# Patient Record
Sex: FEMALE | Race: WHITE | NOT HISPANIC OR LATINO | Employment: OTHER | URBAN - METROPOLITAN AREA
[De-identification: names, ages, dates, MRNs, and addresses within clinical notes are randomized per-mention and may not be internally consistent; named-entity substitution may affect disease eponyms.]

---

## 2018-06-18 ENCOUNTER — TELEPHONE (OUTPATIENT)
Dept: NEUROLOGY | Facility: CLINIC | Age: 78
End: 2018-06-18

## 2018-10-16 ENCOUNTER — TELEPHONE (OUTPATIENT)
Dept: NEUROLOGY | Facility: CLINIC | Age: 78
End: 2018-10-16

## 2018-10-23 ENCOUNTER — OFFICE VISIT (OUTPATIENT)
Dept: NEUROLOGY | Facility: CLINIC | Age: 78
End: 2018-10-23
Payer: COMMERCIAL

## 2018-10-23 VITALS
HEIGHT: 64 IN | DIASTOLIC BLOOD PRESSURE: 78 MMHG | SYSTOLIC BLOOD PRESSURE: 116 MMHG | WEIGHT: 139 LBS | BODY MASS INDEX: 23.73 KG/M2 | HEART RATE: 77 BPM

## 2018-10-23 DIAGNOSIS — R25.1 TREMOR: Primary | ICD-10-CM

## 2018-10-23 DIAGNOSIS — R41.9 COGNITIVE COMPLAINTS WITH NORMAL EXAM: ICD-10-CM

## 2018-10-23 PROBLEM — E78.00 HIGH CHOLESTEROL: Status: ACTIVE | Noted: 2018-10-23

## 2018-10-23 PROBLEM — I10 HYPERTENSION: Status: ACTIVE | Noted: 2018-10-23

## 2018-10-23 PROBLEM — F32.A DEPRESSION: Status: ACTIVE | Noted: 2018-10-23

## 2018-10-23 PROBLEM — F41.9 ANXIETY: Status: ACTIVE | Noted: 2018-10-23

## 2018-10-23 PROCEDURE — 99204 OFFICE O/P NEW MOD 45 MIN: CPT | Performed by: PSYCHIATRY & NEUROLOGY

## 2018-10-23 RX ORDER — OMEPRAZOLE 20 MG/1
20 CAPSULE, DELAYED RELEASE ORAL DAILY
COMMUNITY
Start: 2018-08-20

## 2018-10-23 RX ORDER — GREEN TEA/HOODIA GORDONII 315-12.5MG
CAPSULE ORAL 4 TIMES DAILY PRN
COMMUNITY

## 2018-10-23 RX ORDER — RALOXIFENE HYDROCHLORIDE 60 MG/1
60 TABLET, FILM COATED ORAL DAILY
COMMUNITY
Start: 2016-11-29

## 2018-10-23 RX ORDER — CHOLESTYRAMINE 4 G/9G
POWDER, FOR SUSPENSION ORAL
COMMUNITY
Start: 2018-10-15

## 2018-10-23 RX ORDER — VALACYCLOVIR HYDROCHLORIDE 1 G/1
0.5 TABLET, FILM COATED ORAL DAILY
COMMUNITY
Start: 2018-08-01

## 2018-10-23 RX ORDER — ROSUVASTATIN CALCIUM 5 MG/1
5 TABLET, COATED ORAL DAILY
COMMUNITY

## 2018-10-23 RX ORDER — MEMANTINE HYDROCHLORIDE 10 MG/1
10 TABLET ORAL DAILY
COMMUNITY

## 2018-10-23 RX ORDER — FLUOXETINE HYDROCHLORIDE 40 MG/1
40 CAPSULE ORAL DAILY
COMMUNITY
Start: 2016-03-14

## 2018-10-23 RX ORDER — AMLODIPINE BESYLATE 5 MG/1
5 TABLET ORAL DAILY
COMMUNITY
Start: 2017-01-30

## 2018-10-23 RX ORDER — ANTIOX #8/OM3/DHA/EPA/LUT/ZEAX 250-2.5 MG
2 CAPSULE ORAL DAILY
COMMUNITY

## 2018-10-23 RX ORDER — IRBESARTAN 150 MG/1
150 TABLET ORAL DAILY
COMMUNITY
Start: 2017-01-30

## 2018-10-23 NOTE — ASSESSMENT & PLAN NOTE
The patient is a 70-year-old woman with past medical history significant for anxiety and depression who presents for evaluation of tremor and memory concerns  On examination the patient has a symmetric mild subcentimeter postural and action tremor  She has no evidence of parkinsonism on her exam with normal tone, no focal or global bradykinesia and a normal gait  She does have postural instability, which may be related to her chronic alcohol abuse  On bedside cognitive testing she scored a 27/30 (26+1) on the [place] which is in the normal range, and above average for her age and education level  Tremor: This is most likely an enhanced physiologic tremor (? Related to heavy caffeine intake) verses a medication side effect or less likely very early essential tremor  The patient does have anosmia and possible dream reenactment which raises the risk or developing Parkinson's disease, however she does not currently have Parkinson's disease  Cognitive: The patient's memory complaints are common with normal cognitive aging  She does not qualify as dementia as her memory problems are not significant enough to interfere with her day-to-day activities  I do not believe she qualifies for mild cognitive impairment either as she does not have objective cognitive deficits on testing   - there is no evidence for the use pharmacotherapy in patients with normal cognitive aging or mild cognitive impairment  Memantine is usually used in moderate to severe dementia  I would suggest discontinuing this medication, especially as the patient has had side effects and did not note any improvement in her cognitive symptoms  We will see the patient back in 1 year for repeat testing and to trend her clinical exam   For completeness I would recommend the following blood tests if they have not been ordered previously  - TSH  - B12  - B1    We discussed the importance of aerobic exercise for promoting healthy cognitive aging  We also discussed the importance of her reduced alcohol intake

## 2018-10-23 NOTE — LETTER
October 23, 2018     DO Mode Dunbar  [address] [place], [place] [id number]    Patient: Steph Benavides   YOB: 1940   Date of Visit: 10/23/2018       Dear Dr Zachary Mcclellan: Thank you for referring Steph Benavides to me for evaluation  Below are my notes for this consultation  If you have questions, please do not hesitate to call me  I look forward to following your patient along with you  Sincerely,        Ally Mansfield MD        CC: No Recipients  Ally Mansfield MD  10/23/2018 11:58 AM  Sign at close encounter  Assessment/Plan:    Tremor  The patient is a 28-year-old woman with past medical history significant for anxiety and depression who presents for evaluation of tremor and memory concerns  On examination the patient has a symmetric mild subcentimeter postural and action tremor  She has no evidence of parkinsonism on her exam with normal tone, no focal or global bradykinesia and a normal gait  She does have postural instability, which may be related to her chronic alcohol abuse  On bedside cognitive testing she scored a 27/30 (26+1) on the [place] which is in the normal range, and above average for her age and education level  Tremor: This is most likely an enhanced physiologic tremor (? Related to heavy caffeine intake) verses a medication side effect or less likely very early essential tremor  The patient does have anosmia and possible dream reenactment which raises the risk or developing Parkinson's disease, however she does not currently have Parkinson's disease  Cognitive: The patient's memory complaints are common with normal cognitive aging  She does not qualify as dementia as her memory problems are not significant enough to interfere with her day-to-day activities    I do not believe she qualifies for mild cognitive impairment either as she does not have objective cognitive deficits on testing   - there is no evidence for the use pharmacotherapy in patients with normal cognitive aging or mild cognitive impairment  Memantine is usually used in moderate to severe dementia  I would suggest discontinuing this medication, especially as the patient has had side effects and did not note any improvement in her cognitive symptoms  We will see the patient back in 1 year for repeat testing and to trend her clinical exam   For completeness I would recommend the following blood tests if they have not been ordered previously  - TSH  - B12  - B1    We discussed the importance of aerobic exercise for promoting healthy cognitive aging  We also discussed the importance of her reduced alcohol intake  There are no diagnoses linked to this encounter  Subjective:     Patient ID: eNil Gagnon is a 69 y o  female  I had the pleasure of seeing your patient, Neil Gagnon in the [facility] at the [address] [place] [address]  The patient is a 69y o  year old  female who presents for evaluation of possible Parkinson's disease and dementia  The patient reports that the tremor was 1st noticed by her primary care provider just a few months ago  She is now aware of a tremor with posture and action but is not particularly bothered by this  Her symptoms have not had any impact on her day-to-day life  She has never been evaluated by neurologist or been on medications for tremor  The patient's cognitive symptoms consist primarily of difficulty remembering names and occasional word-finding pauses  In addition she will forget why she entered her particular room  She is currently running her household on her own with minimal help from her family  The patient denies any neuroleptic exposure  She did have a nervous breakdown in her 25s and reports being on multiple medications at that time which were slowly weaned off    She reports that her primary care provider at that time recommended she drink alcohol before bedtime to replace these medications  Unfortunately she has been consuming alcohol on a daily basis since that time  She has recently recognized this is an issue and has been cutting back her alcohol consumption, she has consumed any alcohol in the past week  Alcoholism does run in her family  She reports that she was started on memantine by her primary care provider  She was on b i d  dosing but this resulted in significant balance issues and a fall so was reduced to daily dosing  She does not note any improvement in her cognitive complaints after initiation of this medication  Regarding motor symptoms:   Tremor: bl hands  Only with posture  Maybe with action  Never at rest  Slowness: a little  "I don't really want to do it" also takes longer - just house work  Dressing and preparing meals is not slowed  Stiffness: a little pain in the right arm  Dystonia: no   Changes in facial expression: no   Changes in voice: hearing troubles  No   Changes in writing: messy  Smaller  1-2 years   Changes in gait: a little off balance sometimes  Worse on memantine  Falls: 1 when on higher doses of memantine   Freezing: no   Trouble with swallowing: no   Clumsiness/dexterity: no issue     Regarding non-motor symptoms:   Anosmia: not so great  Worse with age   Constipation: not an issue  Diarrhea since the 80s  Lightheadedness: sometimes  rare  Changes in Mood: good   Trouble with sleep:     REM behavior Disorder: has fallen out of bed a couple of times  Once she was dancing with her   Memory trouble: why did I come in here  Trouble with names  Some word finding troubles  No impairment in daily life  Hallucinations: no     Coffee: 4-5 cups of coffee per day, or more   Alcohol: cutting back       The following portions of the patient's history were reviewed and updated as appropriate: allergies, current medications, past family history, past medical history, past social history, past surgical history and problem list     "St vitus dance" in her sister - lived to 81  Never had abnormal movements  Lives alone  Daughter helps with bills since  passed  Otherwise independent in all activities  Daughter drives her places because she doesn't like to drive  - Another sister with alcoholism and tremor    - No one with parkinsons disease  - completed 10th grade  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[employer]  Objective:      /78 (BP Location: Left arm, Patient Position: Standing, Cuff Size: Standard)   Pulse 77   Ht 5' 4" (1 626 m)   Wt 63 kg (139 lb)   BMI 23 86 kg/m²    Not orthostatic by BP      Physical Exam    Neurological Exam     GENERAL MEDICAL EXAMINATION:  General appearance: alert, in no apparent distress  Appropriately dressed and groomed  Conversing and interacting appropriately  Eyes: Sclera are non-injected  Ears, nose, Mouth, Throat: Mucous membranes are moist    Resp: Breathing comfortably on RA   Musculoskeletal: No evidence of deformities  Contractures in the bilateral palms  No Edema  Skin: No visible rashes  Warm and well perfused  Psych: normal and appropriate affect     NEUROLOGICAL EXAMINATION:  Mental Status: Alert and oriented to person place and time  Able to relate history without difficulty  Attentive: able to name BEVERLY backward without difficulty  Language is fluent and appropriate with normal prosody  There were no paraphasic errors  Speech was not dysarthric  There was no pallilalia noted  Able to follow both midline and appendicular commands  MoCA: 27/30 (26+1)  Visuospatial/executive: 2/5 (points off for numbers and hands on clock)  Naming: 3/3  Memory: 5/5   Attention: 6/6  Language: 3/3  Abstraction: 1/2   Orientation: 6/6    Cranial Nerves:  II:  pupils equally round and briskly reactive to light, both directly and consensually  III-IV-VI: Full and conjugate, extra-ocular eye movements in all directions of gaze   No nystagmus or diplopia  Intact smooth pursuit  VII: Face is symmetric at rest and with activation; symmetric speed and excursion with smile  IX-X: Palate elevates symmetrically  XII: No tongue deviation or fasciculations    Motor: Overall  Normal spontaneous movements  Normal muscle bulk throughout  Normal axial and appendicular tone in the arms and legs  No hypomimia was noted  Speech was not hypophonic  There was no tremor noted at rest  There was no vocal tremor or head tremor  Rapid alternating movements revealed no bradykinesia, decrement or hesitations  There were no dyskinesias or dystonia noted  No pronator drift or rebound  No asterixis or myoclonus noted  Subcentimeter bilateral upper extremity tremor with posture and action  Strength:   Delt Bi Tri We FE FF    C5 C6 C7 C6 C7 C8/T1   R 5 5 5 5 5 5   L 5 5 5 5 5 5      IP Quad Hamst TA    L2 L3 L4-S1 L4   R 5 5 5 5   L 5 5 5 5     Reflexes:  2+ and symmetric throughout    Sensory: normal and symmetric perception of light touch, vibration and temperature  Coordination: Finger to nose without dysmetria bilaterally  Gait: Able to rise from a chair without the use of arms  Posture was normal  Narrow-base and stable stance  Normal initiation  Normal stride length, step height, arm swing  Turn completed in 2 steps  Pull test was Positive with fall back  Review of Systems   Constitutional: Positive for fatigue  Negative for appetite change and fever  HENT: Positive for hearing loss  Negative for tinnitus, trouble swallowing and voice change  Eyes: Negative  Negative for photophobia and pain  Dry eyes     Respiratory: Positive for cough and chest tightness  Cardiovascular: Positive for palpitations  Gastrointestinal: Positive for diarrhea  Negative for nausea and vomiting  Loss of bowel control   Endocrine: Negative  Negative for cold intolerance and heat intolerance  Hair loss     Genitourinary: Positive for urgency  Negative for dysuria and frequency  Loss of bladder control   Musculoskeletal: Positive for back pain and joint swelling  Negative for myalgias and neck pain  Gait problem: pain while walking, balance problems, and difficulty walking  Skin: Negative  Negative for rash  Neurological: Positive for dizziness, tremors and light-headedness  Negative for seizures, syncope, facial asymmetry, speech difficulty, weakness, numbness and headaches  Memory problems  Falls  Taste or smell changes  Clumsiness       Hematological: Negative  Does not bruise/bleed easily  Psychiatric/Behavioral: Positive for confusion  Negative for hallucinations  Sleep disturbance: snoring and waking up at night  The patient is nervous/anxious           Depression     Dana Garcia MD  Medical Director   [facility]  [facility] and Memory Specialist

## 2018-10-23 NOTE — PROGRESS NOTES
Assessment/Plan:    Tremor  The patient is a 27-year-old woman with past medical history significant for anxiety and depression who presents for evaluation of tremor and memory concerns  On examination the patient has a symmetric mild subcentimeter postural and action tremor  She has no evidence of parkinsonism on her exam with normal tone, no focal or global bradykinesia and a normal gait  She does have postural instability, which may be related to her chronic alcohol abuse  On bedside cognitive testing she scored a 27/30 (26+1) on the [place] which is in the normal range, and above average for her age and education level  Tremor: This is most likely an enhanced physiologic tremor (? Related to heavy caffeine intake) verses a medication side effect or less likely very early essential tremor  The patient does have anosmia and possible dream reenactment which raises the risk or developing Parkinson's disease, however she does not currently have Parkinson's disease  Cognitive: The patient's memory complaints are common with normal cognitive aging  She does not qualify as dementia as her memory problems are not significant enough to interfere with her day-to-day activities  I do not believe she qualifies for mild cognitive impairment either as she does not have objective cognitive deficits on testing   - there is no evidence for the use pharmacotherapy in patients with normal cognitive aging or mild cognitive impairment  Memantine is usually used in moderate to severe dementia  I would suggest discontinuing this medication, especially as the patient has had side effects and did not note any improvement in her cognitive symptoms  We will see the patient back in 1 year for repeat testing and to trend her clinical exam   For completeness I would recommend the following blood tests if they have not been ordered previously    - TSH  - B12  - B1    We discussed the importance of aerobic exercise for promoting healthy cognitive aging  We also discussed the importance of her reduced alcohol intake  There are no diagnoses linked to this encounter  Subjective:     Patient ID: Vianey Archer is a 69 y o  female  I had the pleasure of seeing your patient, Vianey Archer in the [facility] Clinic at the [facility]  The patient is a 69y o  year old  female who presents for evaluation of possible Parkinson's disease and dementia  The patient reports that the tremor was 1st noticed by her primary care provider just a few months ago  She is now aware of a tremor with posture and action but is not particularly bothered by this  Her symptoms have not had any impact on her day-to-day life  She has never been evaluated by neurologist or been on medications for tremor  The patient's cognitive symptoms consist primarily of difficulty remembering names and occasional word-finding pauses  In addition she will forget why she entered her particular room  She is currently running her household on her own with minimal help from her family  The patient denies any neuroleptic exposure  She did have a nervous breakdown in her 25s and reports being on multiple medications at that time which were slowly weaned off  She reports that her primary care provider at that time recommended she drink alcohol before bedtime to replace these medications  Unfortunately she has been consuming alcohol on a daily basis since that time  She has recently recognized this is an issue and has been cutting back her alcohol consumption, she has consumed any alcohol in the past week  Alcoholism does run in her family  She reports that she was started on memantine by her primary care provider  She was on b i d  dosing but this resulted in significant balance issues and a fall so was reduced to daily dosing    She does not note any improvement in her cognitive complaints after initiation of this medication  Regarding motor symptoms:   Tremor: bl hands  Only with posture  Maybe with action  Never at rest  Slowness: a little  "I don't really want to do it" also takes longer - just house work  Dressing and preparing meals is not slowed  Stiffness: a little pain in the right arm  Dystonia: no   Changes in facial expression: no   Changes in voice: hearing troubles  No   Changes in writing: messy  Smaller  1-2 years   Changes in gait: a little off balance sometimes  Worse on memantine  Falls: 1 when on higher doses of memantine   Freezing: no   Trouble with swallowing: no   Clumsiness/dexterity: no issue     Regarding non-motor symptoms:   Anosmia: not so great  Worse with age   Constipation: not an issue  Diarrhea since the 80s  Lightheadedness: sometimes  rare  Changes in Mood: good   Trouble with sleep:     REM behavior Disorder: has fallen out of bed a couple of times  Once she was dancing with her   Memory trouble: why did I come in here  Trouble with names  Some word finding troubles  No impairment in daily life  Hallucinations: no     Coffee: 4-5 cups of coffee per day, or more   Alcohol: cutting back  The following portions of the patient's history were reviewed and updated as appropriate: allergies, current medications, past family history, past medical history, past social history, past surgical history and problem list     "St vitus dance" in her sister - lived to 81  Never had abnormal movements  Lives alone  Daughter helps with bills since  passed  Otherwise independent in all activities  Daughter drives her places because she doesn't like to drive  - Another sister with alcoholism and tremor    - No one with parkinsons disease  - completed 10th grade  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[employer]         Objective:      /78 (BP Location: Left arm, Patient Position: Standing, Cuff Size: Standard)   Pulse 77   Ht 5' 4" (1 626 m)   Wt 63 kg (139 lb)   BMI 23 86 kg/m²   Not orthostatic by BP      Physical Exam    Neurological Exam     GENERAL MEDICAL EXAMINATION:  General appearance: alert, in no apparent distress  Appropriately dressed and groomed  Conversing and interacting appropriately  Eyes: Sclera are non-injected  Ears, nose, Mouth, Throat: Mucous membranes are moist    Resp: Breathing comfortably on RA   Musculoskeletal: No evidence of deformities  Contractures in the bilateral palms  No Edema  Skin: No visible rashes  Warm and well perfused  Psych: normal and appropriate affect     NEUROLOGICAL EXAMINATION:  Mental Status: Alert and oriented to person place and time  Able to relate history without difficulty  Attentive: able to name BEVERLY backward without difficulty  Language is fluent and appropriate with normal prosody  There were no paraphasic errors  Speech was not dysarthric  There was no pallilalia noted  Able to follow both midline and appendicular commands  MoCA: 27/30 (26+1)  Visuospatial/executive: 2/5 (points off for numbers and hands on clock)  Naming: 3/3  Memory: 5/5   Attention: 6/6  Language: 3/3  Abstraction: 1/2   Orientation: 6/6    Cranial Nerves:  II:  pupils equally round and briskly reactive to light, both directly and consensually  III-IV-VI: Full and conjugate, extra-ocular eye movements in all directions of gaze  No nystagmus or diplopia  Intact smooth pursuit  VII: Face is symmetric at rest and with activation; symmetric speed and excursion with smile  IX-X: Palate elevates symmetrically  XII: No tongue deviation or fasciculations    Motor: Overall  Normal spontaneous movements  Normal muscle bulk throughout  Normal axial and appendicular tone in the arms and legs  No hypomimia was noted  Speech was not hypophonic  There was no tremor noted at rest  There was no vocal tremor or head tremor  Rapid alternating movements revealed no bradykinesia, decrement or hesitations   There were no dyskinesias or dystonia noted  No pronator drift or rebound  No asterixis or myoclonus noted  Subcentimeter bilateral upper extremity tremor with posture and action  Strength:   Delt Bi Tri We FE FF    C5 C6 C7 C6 C7 C8/T1   R 5 5 5 5 5 5   L 5 5 5 5 5 5      IP Quad Ham[place] TA    L2 L3 L4-S1 L4   R 5 5 5 5   L 5 5 5 5     Reflexes:  2+ and symmetric throughout    Sensory: normal and symmetric perception of light touch, vibration and temperature  Coordination: Finger to nose without dysmetria bilaterally  Gait: Able to rise from a chair without the use of arms  Posture was normal  Narrow-base and stable stance  Normal initiation  Normal stride length, step height, arm swing  Turn completed in 2 steps  Pull test was Positive with fall back  Review of Systems   Constitutional: Positive for fatigue  Negative for appetite change and fever  HENT: Positive for hearing loss  Negative for tinnitus, trouble swallowing and voice change  Eyes: Negative  Negative for photophobia and pain  Dry eyes     Respiratory: Positive for cough and chest tightness  Cardiovascular: Positive for palpitations  Gastrointestinal: Positive for diarrhea  Negative for nausea and vomiting  Loss of bowel control   Endocrine: Negative  Negative for cold intolerance and heat intolerance  Hair loss     Genitourinary: Positive for urgency  Negative for dysuria and frequency  Loss of bladder control   Musculoskeletal: Positive for back pain and joint swelling  Negative for myalgias and neck pain  Gait problem: pain while walking, balance problems, and difficulty walking  Skin: Negative  Negative for rash  Neurological: Positive for dizziness, tremors and light-headedness  Negative for seizures, syncope, facial asymmetry, speech difficulty, weakness, numbness and headaches  Memory problems  Falls  Taste or smell changes  Clumsiness       Hematological: Negative  Does not bruise/bleed easily  Psychiatric/Behavioral: Positive for confusion  Negative for hallucinations  Sleep disturbance: snoring and waking up at night  The patient is nervous/anxious           Depression     Alejandro Yañez MD  Medical Director   [facility]  Movement and Memory Specialist

## 2018-10-23 NOTE — PATIENT INSTRUCTIONS
I do not think that you have parkinson's disease or dementia  I will send a note to your primary care doctor and see you in one year

## 2018-11-07 ENCOUNTER — TELEPHONE (OUTPATIENT)
Dept: NEUROLOGY | Facility: CLINIC | Age: 78
End: 2018-11-07

## 2018-11-07 NOTE — TELEPHONE ENCOUNTER
If the PCP was concerned enough to have her stop driving then I would suggest that she do a driving assessment of some sort, either at [facility] if they have something via her PCP or fitness to drive or via [employer]

## 2018-11-07 NOTE — TELEPHONE ENCOUNTER
Pt called stated her PCP advised that she not drive until she checks with you, stated she forget to ask at her visit if she is cleared to drive  Please advise

## 2018-11-14 NOTE — TELEPHONE ENCOUNTER
Patient made aware that she should not drive until she has a drive eval  She states she has a f/u appt with PCP in Dec and will discuss with PCP at that time, no further questions at this time

## 2024-05-11 ENCOUNTER — OFFICE VISIT (OUTPATIENT)
Dept: URGENT CARE | Age: 84
End: 2024-05-11
Payer: COMMERCIAL

## 2024-05-11 VITALS
TEMPERATURE: 97.5 F | WEIGHT: 135.8 LBS | HEIGHT: 64 IN | BODY MASS INDEX: 23.18 KG/M2 | RESPIRATION RATE: 18 BRPM | HEART RATE: 85 BPM | OXYGEN SATURATION: 98 %

## 2024-05-11 DIAGNOSIS — K13.79 ACUTE PAIN OF MOUTH: Primary | ICD-10-CM

## 2024-05-11 PROCEDURE — 99213 OFFICE O/P EST LOW 20 MIN: CPT

## 2024-05-11 NOTE — PROGRESS NOTES
[facility]        NAME: Rosita Naarnjo is a 83 y.o. female  : 1940    MRN: [id number]  DATE: May 11, 2024  TIME: 2:46 PM    Assessment and Plan   Acute pain of mouth [K13.79]  1. Acute pain of mouth              Patient Instructions   No signs of infection at this time from physical exam.  Recommend follow up with dentist  Discussed that we may not be able to find the exact reason for her discomfort and it would be a process of elimination to rule out dangerous causes of her pain.    If tests have been performed at [facility], our office will contact you with results if changes need to be made to the care plan discussed with you at the visit.  [facility]    Chief Complaint     Chief Complaint   Patient presents with   • Dental Pain     Patient states that on Thursday with lower pain. States that all her jaw jurt, anabelle has been doing ASA, IBU for pain.          History of Present Illness       Pain on the anterior aspect of her lower lip starting 2 days ago.  Reports no injury and did not chew on anything or bite anything that would have caused injury that she can think of.  Just saw dentist 2 weeks ago and daughter states there was no issues with that visit.    Dental Pain   Pertinent negatives include no fever.       Review of Systems   Review of Systems   Constitutional:  Negative for chills and fever.   HENT:  Negative for mouth sores.         Lower jaw pain   Respiratory:  Negative for shortness of breath.    Cardiovascular:  Negative for chest pain.   Neurological:  Negative for dizziness and headaches.         Current Medications       Current Outpatient Medications:   •  amLODIPine (NORVASC) 5 mg tablet, Take 5 mg by mouth daily, Disp: , Rfl:   •  cholestyramine (QUESTRAN) 4 GM/DOSE powder, 2 (two) times a day, Disp: , Rfl:   •  FLUoxetine (PROzac) 40 MG capsule, Take 40 mg by mouth daily, Disp: , Rfl:   •  irbesartan (AVAPRO) 150 mg  "tablet, Take 150 mg by mouth daily, Disp: , Rfl:   •  memantine (NAMENDA) 10 mg tablet, Take 10 mg by mouth daily, Disp: , Rfl:   •  Multiple Vitamins-Minerals (HAIR/SKIN/NAILS/BIOTIN) TABS, Take by mouth 4 (four) times a day as needed, Disp: , Rfl:   •  Multiple Vitamins-Minerals (PRESERVISION AREDS 2) CAPS, Take 2 capsules by mouth daily, Disp: , Rfl:   •  omeprazole (PriLOSEC) 20 mg delayed release capsule, Take 20 mg by mouth daily, Disp: , Rfl:   •  raloxifene (EVISTA) 60 mg tablet, Take 60 mg by mouth daily, Disp: , Rfl:   •  rosuvastatin (CRESTOR) 5 mg tablet, Take 5 mg by mouth daily, Disp: , Rfl:   •  valACYclovir (VALTREX) 1,000 mg tablet, Take 0.5 mg by mouth daily, Disp: , Rfl:     Current Allergies     Allergies as of 05/11/2024 - Reviewed 05/11/2024   Allergen Reaction Noted   • Tetracycline  04/17/2015   • Tetracyclines & related Other (See Comments) 10/09/2020            The following portions of the patient's history were reviewed and updated as appropriate: allergies, current medications, past family history, past medical history, past social history, past surgical history and problem list.     Past Medical History:   Diagnosis Date   • Anxiety    • Arthritis    • Depression    • GERD (gastroesophageal reflux disease)    • High cholesterol    • Hypertension    • Memory loss        Past Surgical History:   Procedure Laterality Date   • HYSTERECTOMY  1978       Family History   Problem Relation Age of Onset   • Heart disease Mother    • Stroke Father    • Dementia Sister    • No Known Problems Sister    • Stroke Sister    • COPD Sister    • Arthritis Sister          Medications have been verified.        Objective   Pulse 85   Temp 97.5 °F (36.4 °C) (Tympanic)   Resp 18   Ht 5' 4\" (1.626 m)   Wt 61.6 kg (135 lb 12.8 oz)   SpO2 98%   BMI 23.31 kg/m²   No LMP recorded. Patient is postmenopausal.       Physical Exam     Physical Exam  Vitals and nursing note reviewed.   Constitutional:       " Appearance: Normal appearance.   HENT:      Head: Normocephalic and atraumatic.      Mouth/Throat:     Pulmonary:      Effort: Pulmonary effort is normal.   Skin:     General: Skin is warm and dry.      Capillary Refill: Capillary refill takes less than 2 seconds.   Neurological:      General: No focal deficit present.      Mental Status: She is alert and oriented to person, place, and time. Mental status is at baseline.      Sensory: No sensory deficit.      Motor: No weakness.   Psychiatric:         Mood and Affect: Mood normal.         Behavior: Behavior normal.         Thought Content: Thought content normal.

## 2024-05-11 NOTE — PATIENT INSTRUCTIONS
No signs of infection at this time from physical exam.  Recommend follow up with dentist  Discussed that we may not be able to find the exact reason for her discomfort and it would be a process of elimination to rule out dangerous causes of her pain.    If tests have been performed at [facility], our office will contact you with results if changes need to be made to the care plan discussed with you at the visit.  You can review your full results on [facility].